# Patient Record
Sex: MALE | Race: WHITE | URBAN - METROPOLITAN AREA
[De-identification: names, ages, dates, MRNs, and addresses within clinical notes are randomized per-mention and may not be internally consistent; named-entity substitution may affect disease eponyms.]

---

## 2017-08-02 ENCOUNTER — IMPORTED ENCOUNTER (OUTPATIENT)
Dept: URBAN - METROPOLITAN AREA CLINIC 50 | Facility: CLINIC | Age: 57
End: 2017-08-02

## 2021-04-08 ENCOUNTER — IMPORTED ENCOUNTER (OUTPATIENT)
Dept: URBAN - METROPOLITAN AREA CLINIC 50 | Facility: CLINIC | Age: 61
End: 2021-04-08

## 2021-04-13 ENCOUNTER — IMPORTED ENCOUNTER (OUTPATIENT)
Dept: URBAN - METROPOLITAN AREA CLINIC 50 | Facility: CLINIC | Age: 61
End: 2021-04-13

## 2021-04-16 ASSESSMENT — VISUAL ACUITY
OS_PH: 20/40
OD_CC: 20/60
OS_PH: 20/200
OD_OTHER: 20/60.
OS_CC: 20/50
OD_BAT: 20/60
OD_CC: 20/40
OS_CC: J1
OD_CC: J1
OD_PH: 20/25
OD_PH: 20/60
OS_CC: 20/200
OS_BAT: 20/200
OS_OTHER: 20/200.

## 2021-04-16 ASSESSMENT — TONOMETRY
OS_IOP_MMHG: 17
OS_IOP_MMHG: 12
OD_IOP_MMHG: 12
OD_IOP_MMHG: 16

## 2021-04-22 ENCOUNTER — PRE-OP - (OUTPATIENT)
Dept: URBAN - METROPOLITAN AREA CLINIC 53 | Facility: CLINIC | Age: 61
End: 2021-04-22

## 2021-04-22 VITALS — DIASTOLIC BLOOD PRESSURE: 84 MMHG | SYSTOLIC BLOOD PRESSURE: 132 MMHG | HEART RATE: 85 BPM | HEIGHT: 60 IN

## 2021-04-22 DIAGNOSIS — H25.812: ICD-10-CM

## 2021-04-22 DIAGNOSIS — H35.373: ICD-10-CM

## 2021-04-22 DIAGNOSIS — H35.362: ICD-10-CM

## 2021-04-22 PROCEDURE — PREOP PRE OP VISIT

## 2021-04-22 PROCEDURE — 92134 CPTRZ OPH DX IMG PST SGM RTA: CPT

## 2021-04-22 ASSESSMENT — TONOMETRY
OS_IOP_MMHG: 14
OD_IOP_MMHG: 14

## 2021-04-22 ASSESSMENT — VISUAL ACUITY
OS_GLARE: 20/200
OD_CC: 20/60
OD_GLARE: 20/60
OS_CC: 20/200
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
## 2021-04-22 NOTE — PATIENT DISCUSSION
CATARACT SURGERY PLANNER - STANDARD IOL/+FEMTO: Phacoemulsification with IOL: Eye: OS|DOS: 05/04/2021|Model: [id number]|Power: +21.50|Target: PLANO|Femto: YES|Arcs: 45 @ 0 ; 45 @ 180|Visc: DUET|Omidria: YES|10% Phenylephrine: YES|Epi-shugarcaine: YES|Phaco Setting: DENSE|BSS+: NO|Trypan Blue: NO|CTR: NO|Olive Tip: NO|Atropine: NO|Pupilloplasty: NO|Notes: N/A.

## 2021-04-22 NOTE — PATIENT DISCUSSION
LENS OPTION (CLASSIC): Discussed with patient in detail all available methods and lens options as well as their associated benefits, limitations and out-of-pocket costs. Patient chooses femtosecond laser-assisted cataract surgery with monofocal lens implant and understands that reading glasses will still be needed after surgery. The patient also understands that with any IOL there is no guarantee that they will not require glasses to see their best at any distance after surgery. The risks, benefits and alternatives to surgery were explained and all questions were answered.